# Patient Record
Sex: MALE | Race: WHITE | HISPANIC OR LATINO | Employment: FULL TIME | URBAN - METROPOLITAN AREA
[De-identification: names, ages, dates, MRNs, and addresses within clinical notes are randomized per-mention and may not be internally consistent; named-entity substitution may affect disease eponyms.]

---

## 2018-10-18 ENCOUNTER — HOSPITAL ENCOUNTER (EMERGENCY)
Facility: CLINIC | Age: 43
Discharge: HOME | End: 2018-10-18
Attending: EMERGENCY MEDICINE | Admitting: EMERGENCY MEDICINE

## 2018-10-18 VITALS
OXYGEN SATURATION: 97 % | HEART RATE: 68 BPM | TEMPERATURE: 96.7 F | DIASTOLIC BLOOD PRESSURE: 78 MMHG | WEIGHT: 214.2 LBS | SYSTOLIC BLOOD PRESSURE: 117 MMHG | RESPIRATION RATE: 16 BRPM

## 2018-10-18 DIAGNOSIS — M54.50 ACUTE BILATERAL LOW BACK PAIN WITHOUT SCIATICA: ICD-10-CM

## 2018-10-18 PROCEDURE — 99284 EMERGENCY DEPT VISIT MOD MDM: CPT | Mod: Z6 | Performed by: EMERGENCY MEDICINE

## 2018-10-18 PROCEDURE — 99282 EMERGENCY DEPT VISIT SF MDM: CPT

## 2018-10-18 RX ORDER — IBUPROFEN 600 MG/1
600 TABLET, FILM COATED ORAL EVERY 8 HOURS PRN
Qty: 20 TABLET | Refills: 0 | Status: SHIPPED | OUTPATIENT
Start: 2018-10-18 | End: 2022-01-05 | Stop reason: ALTCHOICE

## 2018-10-18 RX ORDER — CYCLOBENZAPRINE HCL 10 MG
10 TABLET ORAL 3 TIMES DAILY PRN
Qty: 20 TABLET | Refills: 0 | Status: SHIPPED | OUTPATIENT
Start: 2018-10-18 | End: 2018-10-24

## 2018-10-18 ASSESSMENT — ENCOUNTER SYMPTOMS
ROS GI COMMENTS: NEGATIVE FOR LOSS OF BOWEL CONTROL
NUMBNESS: 0
FEVER: 0
BACK PAIN: 1
MYALGIAS: 1
CONSTITUTIONAL NEGATIVE: 1
ABDOMINAL PAIN: 0
GASTROINTESTINAL NEGATIVE: 1
WEAKNESS: 0
CHILLS: 0
JOINT SWELLING: 0
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
## 2018-10-18 NOTE — ED AVS SNAPSHOT
[facility], [place], Emergency Department    [address]    [facility] [address]    Phone:  [phone]    Fax:  [phone]                                       Javier Vizcaino   MRN: [id number]    Department:  [facility], Emergency Department   Date of Visit:  10/18/2018           After Visit Summary Signature Page     I have received my discharge instructions, and my questions have been answered. I have discussed any challenges I see with this plan with the nurse or doctor.    ..........................................................................................................................................  Patient/Patient Representative Signature      ..........................................................................................................................................  Patient Representative Print Name and Relationship to Patient    ..................................................               ................................................  Date                                   Time    ..........................................................................................................................................  Reviewed by Signature/Title    ...................................................              ..............................................  Date                                               Time          22EPIC Rev 08/18

## 2018-10-18 NOTE — ED AVS SNAPSHOT
[facility], Emergency Department    [address]    [place] [address]    Phone:  [phone]    Fax:  [phone]                                       Javier Vizcaino   MRN: [id number]    Department:  [facility], Emergency Department   Date of Visit:  10/18/2018           Patient Information     Date Of Birth          1975        Your diagnoses for this visit were:     Acute bilateral low back pain without sciatica        You were seen by Aryan Robbins MD.        Discharge Instructions       Use medications as directed  Follow-up with a primary care provider to see how you are doing  If you develop pain that goes down to your foot for 2 weeks or more you should have an MRI of your lumbar spine  Please make an appointment to follow up with [facility] (phone: [phone])    24 Hour Appointment Hotline       To make an appointment at any [facility], call [phone] ([phone]). If you don't have a family doctor or clinic, we will help you find one. [place] clinics are conveniently located to serve the needs of you and your family.             Review of your medicines      START taking        Dose / Directions Last dose taken    cyclobenzaprine 10 MG tablet   Commonly known as:  FLEXERIL   Dose:  10 mg   Quantity:  20 tablet        Take 1 tablet (10 mg) by mouth 3 times daily as needed for muscle spasms   Refills:  0          CONTINUE these medicines which may have CHANGED, or have new prescriptions. If we are uncertain of the size of tablets/capsules you have at home, strength may be listed as something that might have changed.        Dose / Directions Last dose taken    * IBUPROFEN PO   Dose:  800 mg   What changed:  Another medication with the same name was added. Make sure you understand how and when to take each.        Take 800 mg by mouth every 6 hours as needed for moderate pain   Refills:  0        * ibuprofen 600 MG tablet   Commonly  known as:  ADVIL/MOTRIN   Dose:  600 mg   What changed:  You were already taking a medication with the same name, and this prescription was added. Make sure you understand how and when to take each.   Quantity:  20 tablet        Take 1 tablet (600 mg) by mouth every 8 hours as needed for moderate pain   Refills:  0        * Notice:  This list has 2 medication(s) that are the same as other medications prescribed for you. Read the directions carefully, and ask your doctor or other care provider to review them with you.            Prescriptions were sent or printed at these locations (2 Prescriptions)                   Other Prescriptions                Printed at Department/Unit printer (2 of 2)         cyclobenzaprine (FLEXERIL) 10 MG tablet               ibuprofen (ADVIL/MOTRIN) 600 MG tablet                Orders Needing Specimen Collection     None      Pending Results     No orders found from 10/16/2018 to 10/19/2018.            Pending Culture Results     No orders found from 10/16/2018 to 10/19/2018.            Pending Results Instructions     If you had any lab results that were not finalized at the time of your Discharge, you can call the ED Lab Result RN at [phone]. You will be contacted by this team for any positive Lab results or changes in treatment. The nurses are available 7 days a week from 10A to 6:30P.  You can leave a message 24 hours per day and they will return your call.        Thank you for choosing [facility]       Thank you for choosing [facility] for your care. Our goal is always to provide you with excellent care. Hearing back from our patients is one way we can continue to improve our services. Please take a few minutes to complete the written survey that you may receive in the mail after you visit with us. Thank you!        [employer] Information     [employer] lets you send messages to your doctor, view your test results, renew your prescriptions, schedule appointments and more. To sign up, go  "to [email or web address] . Click on \"Log in\" on the left side of the screen, which will take you to the Welcome page. Then click on \"Sign up Now\" on the right side of the page.     You will be asked to enter the access code listed below, as well as some personal information. Please follow the directions to create your username and password.     Your access code is: [email or web address]  Expires: 2019 10:36 AM     Your access code will  in 90 days. If you need help or a new code, please call your [facility] clinic or [phone].        Care EveryWhere ID     This is your Care EveryWhere ID. This could be used by other organizations to access your [facility] medical records  [id number]        Equal Access to Services     LUCILLE BAÑUELOS : Doris Freeman, elizabeth rasmussen, gladis aquino, jerri martines. So [facility] [phone].    ATENCIÓN: Si habla español, tiene a saavedra disposición servicios gratuitos de asistencia lingüística. Llestrada al [phone].    We comply with applicable federal civil rights laws and [place] laws. We do not discriminate on the basis of race, color, national origin, age, disability, sex, sexual orientation, or gender identity.            After Visit Summary       This is your record. Keep this with you and show to your community pharmacist(s) and doctor(s) at your next visit.                  "

## 2018-10-18 NOTE — LETTER
October 18, 2018      To Whom It May Concern:      Javier Vizcaino was seen in our Emergency Department today, 10/18/18.    He may return to work/school when improved.    Sincerely,        Aryan Robbins MD

## 2018-10-18 NOTE — DISCHARGE INSTRUCTIONS
Use medications as directed  Follow-up with a primary care provider to see how you are doing  If you develop pain that goes down to your foot for 2 weeks or more you should have an MRI of your lumbar spine  Please make an appointment to follow up with [facility] (phone: [phone])

## 2019-09-07 ENCOUNTER — HOSPITAL ENCOUNTER (EMERGENCY)
Facility: CLINIC | Age: 44
Discharge: HOME | End: 2019-09-08
Attending: EMERGENCY MEDICINE | Admitting: EMERGENCY MEDICINE

## 2019-09-07 DIAGNOSIS — K42.9 PERIUMBILICAL HERNIA: ICD-10-CM

## 2019-09-07 LAB
BASOPHILS # BLD AUTO: 0 10E9/L (ref 0–0.2)
BASOPHILS NFR BLD AUTO: 0.3 %
DIFFERENTIAL METHOD BLD: ABNORMAL
EOSINOPHIL # BLD AUTO: 0.2 10E9/L (ref 0–0.7)
EOSINOPHIL NFR BLD AUTO: 3.4 %
ERYTHROCYTE [DISTWIDTH] IN BLOOD BY AUTOMATED COUNT: 12.7 % (ref 10–15)
HCT VFR BLD AUTO: 39.8 % (ref 40–53)
HGB BLD-MCNC: 13.4 G/DL (ref 13.3–17.7)
IMM GRANULOCYTES # BLD: 0 10E9/L (ref 0–0.4)
IMM GRANULOCYTES NFR BLD: 0.1 %
LYMPHOCYTES # BLD AUTO: 2.3 10E9/L (ref 0.8–5.3)
LYMPHOCYTES NFR BLD AUTO: 32.1 %
MCH RBC QN AUTO: 29.6 PG (ref 26.5–33)
MCHC RBC AUTO-ENTMCNC: 33.7 G/DL (ref 31.5–36.5)
MCV RBC AUTO: 88 FL (ref 78–100)
MONOCYTES # BLD AUTO: 0.4 10E9/L (ref 0–1.3)
MONOCYTES NFR BLD AUTO: 5.9 %
NEUTROPHILS # BLD AUTO: 4.2 10E9/L (ref 1.6–8.3)
NEUTROPHILS NFR BLD AUTO: 58.2 %
NRBC # BLD AUTO: 0 10*3/UL
NRBC BLD AUTO-RTO: 0 /100
PLATELET # BLD AUTO: 275 10E9/L (ref 150–450)
RBC # BLD AUTO: 4.52 10E12/L (ref 4.4–5.9)
WBC # BLD AUTO: 7.2 10E9/L (ref 4–11)

## 2019-09-07 PROCEDURE — 99284 EMERGENCY DEPT VISIT MOD MDM: CPT | Mod: Z6 | Performed by: EMERGENCY MEDICINE

## 2019-09-07 PROCEDURE — 85025 COMPLETE CBC W/AUTO DIFF WBC: CPT | Performed by: EMERGENCY MEDICINE

## 2019-09-07 PROCEDURE — 80048 BASIC METABOLIC PNL TOTAL CA: CPT | Performed by: EMERGENCY MEDICINE

## 2019-09-07 PROCEDURE — 99285 EMERGENCY DEPT VISIT HI MDM: CPT | Mod: 25 | Performed by: EMERGENCY MEDICINE

## 2019-09-07 RX ORDER — IBUPROFEN 200 MG
400 TABLET ORAL EVERY 4 HOURS PRN
COMMUNITY
End: 2022-01-05 | Stop reason: ALTCHOICE

## 2019-09-07 ASSESSMENT — ENCOUNTER SYMPTOMS
DIARRHEA: 0
APPETITE CHANGE: 0
CONSTIPATION: 0
ABDOMINAL PAIN: 1

## 2019-09-07 ASSESSMENT — MIFFLIN-ST. JEOR: SCORE: 1921.38

## 2019-09-07 NOTE — ED AVS SNAPSHOT
[facility], [place], Emergency Department  [address]  [facility] [address]  Phone:  [phone]  Fax:  [phone]                                    Javier Vizcaino   MRN: [id number]    Department:  [facility], Emergency Department   Date of Visit:  9/7/2019           After Visit Summary Signature Page    I have received my discharge instructions, and my questions have been answered. I have discussed any challenges I see with this plan with the nurse or doctor.    ..........................................................................................................................................  Patient/Patient Representative Signature      ..........................................................................................................................................  Patient Representative Print Name and Relationship to Patient    ..................................................               ................................................  Date                                   Time    ..........................................................................................................................................  Reviewed by Signature/Title    ...................................................              ..............................................  Date                                               Time          22EPIC Rev 08/18

## 2019-09-08 ENCOUNTER — APPOINTMENT (OUTPATIENT)
Dept: CT IMAGING | Facility: CLINIC | Age: 44
End: 2019-09-08
Attending: EMERGENCY MEDICINE

## 2019-09-08 VITALS
HEIGHT: 72 IN | DIASTOLIC BLOOD PRESSURE: 67 MMHG | BODY MASS INDEX: 29.66 KG/M2 | SYSTOLIC BLOOD PRESSURE: 108 MMHG | HEART RATE: 65 BPM | TEMPERATURE: 97.7 F | RESPIRATION RATE: 16 BRPM | WEIGHT: 219 LBS | OXYGEN SATURATION: 98 %

## 2019-09-08 LAB
ANION GAP SERPL CALCULATED.3IONS-SCNC: 8 MMOL/L (ref 3–14)
BUN SERPL-MCNC: 28 MG/DL (ref 7–30)
CALCIUM SERPL-MCNC: 8.1 MG/DL (ref 8.5–10.1)
CHLORIDE SERPL-SCNC: 104 MMOL/L (ref 94–109)
CO2 SERPL-SCNC: 28 MMOL/L (ref 20–32)
CREAT SERPL-MCNC: 1.17 MG/DL (ref 0.66–1.25)
GFR SERPL CREATININE-BSD FRML MDRD: 75 ML/MIN/{1.73_M2}
GLUCOSE SERPL-MCNC: 108 MG/DL (ref 70–99)
POTASSIUM SERPL-SCNC: 3.7 MMOL/L (ref 3.4–5.3)
SODIUM SERPL-SCNC: 140 MMOL/L (ref 133–144)

## 2019-09-08 PROCEDURE — 74177 CT ABD & PELVIS W/CONTRAST: CPT

## 2019-09-08 PROCEDURE — 25000128 H RX IP 250 OP 636: Performed by: EMERGENCY MEDICINE

## 2019-09-08 RX ORDER — IOPAMIDOL 755 MG/ML
100 INJECTION, SOLUTION INTRAVASCULAR ONCE
Status: COMPLETED | OUTPATIENT
Start: 2019-09-08 | End: 2019-09-08

## 2019-09-08 RX ADMIN — IOPAMIDOL 100 ML: 755 INJECTION, SOLUTION INTRAVENOUS at 00:45

## 2019-09-08 NOTE — ED PROVIDER NOTES
History     Chief Complaint   Patient presents with     Abdominal Pain     intermittent pain since Monday and the lump in his navel is getting bigger; currently the lump/protrusion is the size of a quarter to half dollar; tender to touch ; no hx of hernia     HPI  Javier Vizcaino is a 44 year old male who presents for evaluation of abdominal pain. The patient states he's had a lump near his belly button for the past year. He reports the lump has increased in size this past week and has had intermittent pain around it as well. Today, his pain became constant. He denies change in appetite or stool. Of note, the patient does have a 3 year old daughter and will often lift her.    History reviewed. No pertinent past medical history.    Past Surgical History:   Procedure Laterality Date     ABDOMEN SURGERY      appendectomy     CARDIAC SURGERY         History reviewed. No pertinent family history.    Social History     Tobacco Use     Smoking status: Never Smoker     Smokeless tobacco: Never Used   Substance Use Topics     Alcohol use: No       No current facility-administered medications for this encounter.      Current Outpatient Medications   Medication     ibuprofen (ADVIL/MOTRIN) 200 MG tablet     ibuprofen (ADVIL/MOTRIN) 600 MG tablet     IBUPROFEN PO      No Known Allergies    I have reviewed the Medications, Allergies, Past Medical and Surgical History, and Social History in the Epic system.    Review of Systems   Constitutional: Negative for appetite change.   Gastrointestinal: Positive for abdominal pain. Negative for constipation and diarrhea.       Physical Exam   BP: 125/73  Pulse: 75  Temp: 97.7  F (36.5  C)  Resp: 16  Height: 182.9 cm (6')  Weight: 99.3 kg (219 lb)  SpO2: 95 %      Physical Exam   Constitutional: He is oriented to person, place, and time. He appears well-developed and well-nourished.   HENT:   Head: Atraumatic.   Eyes: EOM are normal.   Cardiovascular: Normal rate, regular rhythm and  normal heart sounds.   Pulmonary/Chest: Effort normal and breath sounds normal.   Abdominal: Soft. Bowel sounds are normal. A hernia is present. Hernia confirmed positive in the ventral area (with tenderness over the hernia.  ).   Neurological: He is alert and oriented to person, place, and time.   Skin: Skin is dry.   Nursing note and vitals reviewed.      ED Course        Procedures       10:57 PM  The patient was seen and examined by Dr. Mccoy in Room 19.        Labs Ordered and Resulted from Time of ED Arrival Up to the Time of Departure from the ED   CBC WITH PLATELETS DIFFERENTIAL - Abnormal; Notable for the following components:       Result Value    Hematocrit 39.8 (*)     All other components within normal limits   BASIC METABOLIC PANEL - Abnormal; Notable for the following components:    Glucose 108 (*)     Calcium 8.1 (*)     All other components within normal limits   MAY SALINE LOCK IV   FREE WATER     Results for orders placed or performed during the hospital encounter of 09/07/19   CT Abdomen Pelvis w Contrast    Narrative    CT ABDOMEN AND PELVIS WITH CONTRAST   9/8/2019 12:48 AM     HISTORY: Complicated hernia.    COMPARISON: None.    TECHNIQUE: Following the uneventful administration of 100 mL  Isovue-370 intravenous contrast, helical sections were acquired from  the top of the diaphragm through the pubic symphysis. Coronal  reconstructions were generated. Radiation dose for this scan was  reduced using automated exposure control, adjustment of the mA and/or  kV according to the patient's size, or iterative reconstruction  technique.    FINDINGS:     Abdomen: The liver, spleen, pancreas, adrenal glands and kidneys are  unremarkable. 1.7 cm intermediate attenuation structure in the  gallbladder, likely a gallstone. No enlarged lymph nodes or free fluid  in the upper abdomen.    Scan through the lower chest is significant for a few linear opacities  in bilateral lung bases that likely represent  scarring and/or  atelectasis.    Pelvis: The small and large bowel are normal in caliber. The appendix  is not visualized. No bowel wall thickening, pneumatosis or free  intraperitoneal gas. No enlarged lymph nodes or free fluid in the  pelvis. Small paraumbilical hernia containing fat. Slight haziness is  present within the fat within the hernia (series 2, image 62).      Impression    IMPRESSION:   1. Small paraumbilical hernia containing fat. There is slight haziness  within the fat within the hernia that could relate to inflammation.  2. No other cause of acute pain identified in the abdomen or pelvis.  3. Probable cholelithiasis.    CURLY CORREIA MD   CBC with platelets differential   Result Value Ref Range    WBC 7.2 4.0 - 11.0 10e9/L    RBC Count 4.52 4.4 - 5.9 10e12/L    Hemoglobin 13.4 13.3 - 17.7 g/dL    Hematocrit 39.8 (L) 40.0 - 53.0 %    MCV 88 78 - 100 fl    MCH 29.6 26.5 - 33.0 pg    MCHC 33.7 31.5 - 36.5 g/dL    RDW 12.7 10.0 - 15.0 %    Platelet Count 275 150 - 450 10e9/L    Diff Method Automated Method     % Neutrophils 58.2 %    % Lymphocytes 32.1 %    % Monocytes 5.9 %    % Eosinophils 3.4 %    % Basophils 0.3 %    % Immature Granulocytes 0.1 %    Nucleated RBCs 0 0 /100    Absolute Neutrophil 4.2 1.6 - 8.3 10e9/L    Absolute Lymphocytes 2.3 0.8 - 5.3 10e9/L    Absolute Monocytes 0.4 0.0 - 1.3 10e9/L    Absolute Eosinophils 0.2 0.0 - 0.7 10e9/L    Absolute Basophils 0.0 0.0 - 0.2 10e9/L    Abs Immature Granulocytes 0.0 0 - 0.4 10e9/L    Absolute Nucleated RBC 0.0    Basic metabolic panel   Result Value Ref Range    Sodium 140 133 - 144 mmol/L    Potassium 3.7 3.4 - 5.3 mmol/L    Chloride 104 94 - 109 mmol/L    Carbon Dioxide 28 20 - 32 mmol/L    Anion Gap 8 3 - 14 mmol/L    Glucose 108 (H) 70 - 99 mg/dL    Urea Nitrogen 28 7 - 30 mg/dL    Creatinine 1.17 0.66 - 1.25 mg/dL    GFR Estimate 75 >60 mL/min/[1.73_m2]    GFR Estimate If Black 87 >60 mL/min/[1.73_m2]    Calcium 8.1 (L) 8.5 - 10.1 mg/dL             Assessments & Plan (with Medical Decision Making)   The patient presents to the ED with pain and swelling around the umbilical region.  He has a small umbilical hernia with tenderness.  Initially I am unable to reduce it.  His abdomen is soft/benign.  Labs were ordered and reviewed.  WBC is normal.  Abd/pelvic CT was done and shows a fat containing periumbilical hernia.  I again attempted to reduce the hernia and was eventually able to reduce it.  We discussed CT results.  He was discharge with surgery f/u, avoiding straining and lifting greater than 10 lbs. He has a 3 yo daughter at home.      I have reviewed the nursing notes.    I have reviewed the findings, diagnosis, plan and need for follow up with the patient.    New Prescriptions    No medications on file       Final diagnoses:   Periumbilical hernia   I, Justen Hammond, am serving as a trained medical scribe to document services personally performed by Aleja Mccoy MD, based on the provider's statements to me.   IAleja MD, was physically present and have reviewed and verified the accuracy of this note documented by Justen Hammond.    9/7/2019   [facility], [place], EMERGENCY DEPARTMENT     Aleja Mccoy MD  09/08/19 0145

## 2019-09-08 NOTE — DISCHARGE INSTRUCTIONS
Please make an appointment to follow up with Surgery (General) (phone: [phone]) as soon as possible to discuss hernia repair.    Return if worsening abdominal pain, vomiting.     Avoid straining.  Do not lift more than 10 lbs until you see the surgeon.  Keep your stool soft.  You can use benefiber to keep your stool soft (found at local drug store/[employer])

## 2019-09-11 ENCOUNTER — OFFICE VISIT (OUTPATIENT)
Dept: SURGERY | Facility: CLINIC | Age: 44
End: 2019-09-11

## 2019-09-11 VITALS
OXYGEN SATURATION: 97 % | HEART RATE: 71 BPM | SYSTOLIC BLOOD PRESSURE: 118 MMHG | TEMPERATURE: 98.3 F | WEIGHT: 215.8 LBS | HEIGHT: 72 IN | BODY MASS INDEX: 29.23 KG/M2 | DIASTOLIC BLOOD PRESSURE: 74 MMHG

## 2019-09-11 DIAGNOSIS — K42.0 UMBILICAL HERNIA WITH OBSTRUCTION: Primary | ICD-10-CM

## 2019-09-11 ASSESSMENT — PAIN SCALES - GENERAL: PAINLEVEL: MODERATE PAIN (5)

## 2019-09-11 ASSESSMENT — MIFFLIN-ST. JEOR: SCORE: 1906.86

## 2019-09-11 NOTE — LETTER
Date:September 12, 2019      Patient was self referred, no letter generated. Do not send.        [facility] Information

## 2019-09-11 NOTE — NURSING NOTE
Chief Complaint   Patient presents with     Consult     New hernia consult.       Vitals:    09/11/19 0912   BP: 118/74   BP Location: Left arm   Patient Position: Sitting   Cuff Size: Adult Large   Pulse: 71   Temp: 98.3  F (36.8  C)   TempSrc: Oral   SpO2: 97%   Weight: 97.9 kg (215 lb 12.8 oz)   Height: 1.829 m (6')       Body mass index is 29.27 kg/m .                 Carole Handley CMA

## 2019-09-11 NOTE — PROGRESS NOTES
This is a very pleasant 44-year-old gentleman who presents with symptomatic umbilical hernia which was evaluated in the emergency department and easily reduced.  The patient actually had a weight of almost 300 pounds he is down to 215 pounds today.  He had lost on his own intentionally the weight and continues to start to lose weight.  On examination he is a very small umbilical hernia approximately 1 cm in size.  There is no evidence of erythema and of the hernia is completely reduced.  His past medical history and surgical history is significant for cardiac surgery as a child.  Our plan is to ask him to lose an additional 15 pounds if he has no more symptoms then he will not  need any additional follow-up for this.  If he still is experiencing discomfort we will plan for an open umbilical hernia repair.

## 2019-09-11 NOTE — LETTER
9/11/2019       RE: Javier Vizcaino  [address]  [facility] [address]     Dear Colleague,    Thank you for referring your patient, Javier Vizcaino, to the [facility] GENERAL SURGERY at [facility]. Please see a copy of my visit note below.    This is a very pleasant 44-year-old gentleman who presents with symptomatic umbilical hernia which was evaluated in the emergency department and easily reduced.  The patient actually had a weight of almost 300 pounds he is down to 215 pounds today.  He had lost on his own intentionally the weight and continues to start to lose weight.  On examination he is a very small umbilical hernia approximately 1 cm in size.  There is no evidence of erythema and of the hernia is completely reduced.  His past medical history and surgical history is significant for cardiac surgery as a child.  Our plan is to ask him to lose an additional 15 pounds if he has no more symptoms then he will not  need any additional follow-up for this.  If he still is experiencing discomfort we will plan for an open umbilical hernia repair.    Again, thank you for allowing me to participate in the care of your patient.      Sincerely,    Reynold Correia MD

## 2021-06-05 ENCOUNTER — HEALTH MAINTENANCE LETTER (OUTPATIENT)
Age: 46
End: 2021-06-05

## 2021-09-25 ENCOUNTER — HEALTH MAINTENANCE LETTER (OUTPATIENT)
Age: 46
End: 2021-09-25

## 2022-01-05 ENCOUNTER — HOSPITAL ENCOUNTER (EMERGENCY)
Facility: CLINIC | Age: 47
Discharge: HOME | End: 2022-01-05
Attending: EMERGENCY MEDICINE | Admitting: EMERGENCY MEDICINE
Payer: COMMERCIAL

## 2022-01-05 VITALS
DIASTOLIC BLOOD PRESSURE: 81 MMHG | RESPIRATION RATE: 16 BRPM | OXYGEN SATURATION: 97 % | TEMPERATURE: 98.5 F | SYSTOLIC BLOOD PRESSURE: 131 MMHG | HEART RATE: 78 BPM

## 2022-01-05 DIAGNOSIS — M54.41 ACUTE RIGHT-SIDED LOW BACK PAIN WITH RIGHT-SIDED SCIATICA: ICD-10-CM

## 2022-01-05 DIAGNOSIS — Z20.822 SUSPECTED 2019 NOVEL CORONAVIRUS INFECTION: ICD-10-CM

## 2022-01-05 LAB
FLUAV RNA SPEC QL NAA+PROBE: NEGATIVE
FLUBV RNA RESP QL NAA+PROBE: NEGATIVE
SARS-COV-2 RNA RESP QL NAA+PROBE: NEGATIVE

## 2022-01-05 PROCEDURE — 87636 SARSCOV2 & INF A&B AMP PRB: CPT | Performed by: EMERGENCY MEDICINE

## 2022-01-05 PROCEDURE — 99284 EMERGENCY DEPT VISIT MOD MDM: CPT | Performed by: EMERGENCY MEDICINE

## 2022-01-05 PROCEDURE — 250N000013 HC RX MED GY IP 250 OP 250 PS 637: Performed by: EMERGENCY MEDICINE

## 2022-01-05 PROCEDURE — 99283 EMERGENCY DEPT VISIT LOW MDM: CPT

## 2022-01-05 PROCEDURE — C9803 HOPD COVID-19 SPEC COLLECT: HCPCS

## 2022-01-05 RX ORDER — IBUPROFEN 600 MG/1
600 TABLET, FILM COATED ORAL ONCE
Status: COMPLETED | OUTPATIENT
Start: 2022-01-05 | End: 2022-01-05

## 2022-01-05 RX ORDER — IBUPROFEN 200 MG
400 TABLET ORAL EVERY 6 HOURS PRN
Qty: 60 TABLET | Refills: 0 | Status: SHIPPED | OUTPATIENT
Start: 2022-01-05

## 2022-01-05 RX ORDER — OXYCODONE AND ACETAMINOPHEN 5; 325 MG/1; MG/1
1 TABLET ORAL ONCE
Status: COMPLETED | OUTPATIENT
Start: 2022-01-05 | End: 2022-01-05

## 2022-01-05 RX ORDER — ACETAMINOPHEN 500 MG
1000 TABLET ORAL EVERY 6 HOURS PRN
Qty: 30 TABLET | Refills: 0 | Status: SHIPPED | OUTPATIENT
Start: 2022-01-05

## 2022-01-05 RX ORDER — CYCLOBENZAPRINE HCL 10 MG
10 TABLET ORAL 3 TIMES DAILY PRN
Qty: 20 TABLET | Refills: 0 | Status: SHIPPED | OUTPATIENT
Start: 2022-01-05 | End: 2022-01-11

## 2022-01-05 RX ADMIN — OXYCODONE HYDROCHLORIDE AND ACETAMINOPHEN 1 TABLET: 5; 325 TABLET ORAL at 16:15

## 2022-01-05 RX ADMIN — IBUPROFEN 600 MG: 600 TABLET ORAL at 16:15

## 2022-01-05 NOTE — DISCHARGE INSTRUCTIONS
"Please make an appointment to follow up with Your Primary Care Provider in 5-10 days.  You should also keep your appointment with physical therapy.    Please return the emergency department if you have worsening pain, numbness or weakness in your legs or feet, loss of bowel or bladder control, numbness in the groin area, difficulty walking, fever, any other concerns.    Discharge Instructions for COVID-19 Patients  You have--or may have--COVID-19. Please follow the instructions listed below.   If you have a weakened immune system, discuss with your doctor any other actions you need to take.  How can I protect others?  If you have symptoms (fever, cough, body aches or trouble breathing):  Stay home and away from others (self-isolate) until:  Your other symptoms have resolved (gotten better). And   You've had no fever--and no medicine that reduces fever--for 1 full day (24 hours). And   At least 10 days have passed since your symptoms started. (You may need to wait 20 days. Follow the advice of your care team.)  If you don't show symptoms, but testing showed that you have COVID-19:  Stay home and away from others (self-isolate) until at least 10 days have passed since the date of your first positive COVID-19 test.  During this time  Stay in your own room, even for meals. Use your own bathroom if you can.  Stay away from others in your home. No hugging, kissing or shaking hands. No visitors.  Don't go to work, school or anywhere else.  Clean \"high touch\" surfaces often (doorknobs, counters, handles). Use household cleaning spray or wipes.  You'll find a full list of  on the EPA website: www.epa.gov/pesticide-registration/list-n-disinfectants-use-against-sars-cov-2.  Cover your mouth and nose with a mask or other face covering to avoid spreading germs.  Wash your hands and face often. Use soap and water.  Caregivers in these groups are at risk for severe illness due to COVID-19:  People 65 years and " older  People who live in a nursing home or long-term care facility  People with chronic disease (lung, heart, cancer, diabetes, kidney, liver, immunologic)  People who have a weakened immune system, including those who:  Are in cancer treatment  Take medicine that weakens the immune system, such as corticosteroids  Had a bone marrow or organ transplant  Have an immune deficiency  Have poorly controlled HIV or AIDS  Are obese (body mass index of 40 or higher)  Smoke regularly  Caregivers should wear gloves while washing dishes, handling laundry and cleaning bedrooms and bathrooms.  Use caution when washing and drying laundry: Don't shake dirty laundry and use the warmest water setting that you can.  For more tips on managing your health at home, go to www.cdc.gov/coronavirus/2019-ncov/downloads/10Things.pdf.  How can I take care of myself at home?  Get lots of rest. Drink extra fluids (unless a doctor has told you not to).  Take Tylenol (acetaminophen) for fever or pain. If you have liver or kidney problems, ask your family doctor if it's okay to take Tylenol.   Adults can take either:   650 mg (two 325 mg pills) every 4 to 6 hours, or   1,000 mg (two 500 mg pills) every 8 hours as needed.  Note: Don't take more than 3,000 mg in one day. Acetaminophen is found in many medicines (both prescribed and over-the-counter medicines). Read all labels to be sure you don't take too much.   For children, check the Tylenol bottle for the right dose. The dose is based on the child's age or weight.  If you have other health problems (like cancer, heart failure, an organ transplant or severe kidney disease): Call your specialty clinic if you don't feel better in the next 2 days.  Know when to call 911. Emergency warning signs include:  Trouble breathing or shortness of breath  Pain or pressure in the chest that doesn't go away  Feeling confused like you haven't felt before, or not being able to wake up  Bluish-colored lips or  face  Your doctor may have prescribed a blood thinner medicine. Follow their instructions.  Where can I get more information?  [facility] - About COVID-19:   [email or web address]  CDC - What to Do If You're Sick: www.cdc.gov/coronavirus/2019-ncov/about/steps-when-sick.html  CDC - Ending Home Isolation: www.cdc.gov/coronavirus/2019-ncov/hcp/disposition-in-home-patients.html  CDC - Caring for Someone: www.cdc.gov/coronavirus/2019-ncov/if-you-are-sick/care-for-someone.html  [facility] - Interim Guidance for Hospital Discharge to Home: www.health.[facility].[place]./diseases/coronavirus/hcp/hospdischarge.pdf  Below are the COVID-19 hotlines at the [place] Department of Health ([facility]). Interpreters are available.  For health questions: Call 953-224-1870 or 1-883.896.9366 (7 a.m. to 7 p.m.)  For questions about schools and childcare: Call 157-093-9576 or 1-826.269.4770 (7 a.m. to 7 p.m.)    For informational purposes only. Not to replace the advice of your health care provider. Clinically reviewed by Dr. Zohaib Gutierres.   Copyright   2020 [employer]. All rights reserved. "[employer] [id number] - REV 01/05/21.        Please check [id number] for the results of your Covid test and influenza test.  If your test is positive, you will need to quarantine for 5 to 10 days, only 5 days if you have a negative test on day 5.

## 2022-01-05 NOTE — ED PROVIDER NOTES
[facility] EMERGENCY DEPARTMENT ([facility])    1/05/22        History     Chief Complaint   Patient presents with     Back Pain     HPI  Javier Vizcaino is a 46 year old male who presents to the Emergency Department with back pain.  Patient states that he has back pain since a work-related lifting injury on December 12.  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  Patient was seen at [facility] at that time and x-rays of the lumbar spine were negative.  He was prescribed Robaxin, ibuprofen and Tylenol.  Patient states he has run out of these medications and his pain is returned.  He was referred for some physical therapy, however, his first appointment was canceled and so he has not been to physical therapy yet.  His next appointment is scheduled for next week.  He denies fever, previous back surgery, loss of bowel or bladder control, saddle anesthesia.  Patient reports the pain is primarily in the right lower side of the back and is radiating into the right leg.  It does not radiate past the distal femur.  Patient also reports a cough that he has had for the last couple of days.  His wife has had a fever and cough as well.  He has been immunized against COVID-19, but did not get a flu shot this year.    Past Medical History  History reviewed. No pertinent past medical history.  Past Surgical History:   Procedure Laterality Date     ABDOMEN SURGERY      appendectomy     CARDIAC SURGERY       acetaminophen (TYLENOL) 500 MG tablet  cyclobenzaprine (FLEXERIL) 10 MG tablet  ibuprofen (ADVIL/MOTRIN) 200 MG tablet      No Known Allergies  Family History  No family history on file.  Social History   Social History     Tobacco Use     Smoking status: Never Smoker     Smokeless tobacco: Never Used   Substance Use Topics     Alcohol use: No     Drug use: No      Past medical history, past surgical history, medications, allergies,  family history, and social history were reviewed with the patient. No additional pertinent items.       Review of Systems  A complete review of systems was performed with pertinent positives and negatives noted in the HPI, and all other systems negative.    Physical Exam   BP: 131/81  Pulse: 78  Temp: 98.5  F (36.9  C)  Resp: 16  SpO2: 97 %  Physical Exam    GEN:  Alert, well developed, no acute distress  HEENT:  PERRL, EOMI, Mucous membranes are moist.   Cardio:  RRR, no murmur, radial pulses equal bilaterally  PULM:  Lungs clear, good air movement, no wheezes, rales   Abd:  Soft, normal bowel sounds, no focal tenderness  Back exam:  No CVA tenderness, no T-spine tenderness, L-spine tenderness.  Patient has some mild tenderness along the right paraspinal area at the lumbar level.  There is no rash on the back, no ecchymosis.  Musculoskeletal:  normal range of motion of the extremities, no lower extremity swelling or calf tenderness  Neuro:  Alert and oriented X3, Follows commands, moving all extremities spontaneously.  Normal strength and sensation throughout the lower extremities bilaterally, normal patellar reflexes bilaterally.  Skin:  Warm, dry   ED Course      Procedures         Patient was given Percocet and ibuprofen for pain in the ED.  Care everywhere records were reviewed.  Lumbar spine x-ray done at [facility] on December 12 results were reviewed and are shown below.    FINDINGS: 3 upright views of the lumbar spine demonstrate mild rightward curvature, likely positional. There is no fracture. There is no subluxation. Mild degenerative disc and facet changes between L4 and S1. Nonobstructive bowel gas pattern.     IMPRESSION   IMPRESSION: Mild degenerative changes in the lower lumbar spine. No acute fracture or subluxation.     Reading Radiologist: Eddie Fraire   Specimen Collected: 12/12/21      The x-ray results were discussed with the patient.  We discussed indications for MRI and I  reassured the patient that he does not have any acute neurologic deficits or need for emergent MRI today.     No results found for any visits on 01/05/22.  Medications   oxyCODONE-acetaminophen (PERCOCET) 5-325 MG per tablet 1 tablet (1 tablet Oral Given 1/5/22 1615)   ibuprofen (ADVIL/MOTRIN) tablet 600 mg (600 mg Oral Given 1/5/22 1615)        Assessments & Plan (with Medical Decision Making)   Patient presents with work-related back pain that occurred December 12.  He continues to have pain and has run out of his prescriptions for acetaminophen, ibuprofen and Robaxin.  Patient symptoms are most suggestive of sciatica with pain radiating down the leg.  Doubt acute pyelonephritis given lack of fever and pain is lower than what would be expected for pyelonephritis.  Given the timing of the pain occurring just after heavy lifting, I also doubt acute ureteral stone.  He plans to keep his appointment for physical therapy starting next week.  There are no signs of cauda equina syndrome or acute cord compression.  No neurologic deficits.  I do not think patient needs emergent MRI.  He was happy to know that the x-ray was negative for fracture or malalignment.  Patient was advised to follow-up as outpatient primary care provider and consider outpatient MRI.  Patient was given prescriptions for acetaminophen, Flexeril and ibuprofen.  Patient was advised to return the emergency department if he develops fever, worsening pain, numbness or weakness in the legs or feet, loss of bowel or bladder control, numbness in the groin area, any other concerns.    I have reviewed the nursing notes. I have reviewed the findings, diagnosis, plan and need for follow up with the patient.    New Prescriptions    ACETAMINOPHEN (TYLENOL) 500 MG TABLET    Take 2 tablets (1,000 mg) by mouth every 6 hours as needed for mild pain    CYCLOBENZAPRINE (FLEXERIL) 10 MG TABLET    Take 1 tablet (10 mg) by mouth 3 times daily as needed for muscle spasms     IBUPROFEN (ADVIL/MOTRIN) 200 MG TABLET    Take 2 tablets (400 mg) by mouth every 6 hours as needed for moderate pain       Final diagnoses:   Acute right-sided low back pain with right-sided sciatica   Suspected 2019 novel coronavirus infection       --  Prisca Truong MD  [facility] EMERGENCY DEPARTMENT  1/5/2022     Prisca Truong MD  01/05/22 [id number]

## 2022-01-05 NOTE — ED TRIAGE NOTES
Patient had a lifting injury at work 2 weeks ago, back has been sore. Was seen, told it was muscles, and given medication. Woke up today with worse pain.

## 2022-07-02 ENCOUNTER — HEALTH MAINTENANCE LETTER (OUTPATIENT)
Age: 47
End: 2022-07-02

## 2022-11-08 ENCOUNTER — HOSPITAL ENCOUNTER (EMERGENCY)
Facility: CLINIC | Age: 47
Discharge: HOME | End: 2022-11-08
Attending: EMERGENCY MEDICINE | Admitting: EMERGENCY MEDICINE
Payer: COMMERCIAL

## 2022-11-08 VITALS
RESPIRATION RATE: 16 BRPM | TEMPERATURE: 98.8 F | DIASTOLIC BLOOD PRESSURE: 78 MMHG | HEART RATE: 78 BPM | SYSTOLIC BLOOD PRESSURE: 120 MMHG | OXYGEN SATURATION: 98 %

## 2022-11-08 DIAGNOSIS — J10.1 INFLUENZA A: ICD-10-CM

## 2022-11-08 LAB
FLUAV RNA SPEC QL NAA+PROBE: POSITIVE
FLUBV RNA RESP QL NAA+PROBE: NEGATIVE
RSV RNA SPEC NAA+PROBE: NEGATIVE
SARS-COV-2 RNA RESP QL NAA+PROBE: NEGATIVE

## 2022-11-08 PROCEDURE — 99283 EMERGENCY DEPT VISIT LOW MDM: CPT | Mod: CS | Performed by: EMERGENCY MEDICINE

## 2022-11-08 PROCEDURE — 99284 EMERGENCY DEPT VISIT MOD MDM: CPT | Mod: CS | Performed by: EMERGENCY MEDICINE

## 2022-11-08 PROCEDURE — C9803 HOPD COVID-19 SPEC COLLECT: HCPCS | Performed by: EMERGENCY MEDICINE

## 2022-11-08 PROCEDURE — 87637 SARSCOV2&INF A&B&RSV AMP PRB: CPT | Performed by: EMERGENCY MEDICINE

## 2022-11-08 RX ORDER — OSELTAMIVIR PHOSPHATE 75 MG/1
75 CAPSULE ORAL 2 TIMES DAILY
Qty: 10 CAPSULE | Refills: 0 | Status: SHIPPED | OUTPATIENT
Start: 2022-11-08 | End: 2022-11-13

## 2022-11-08 RX ORDER — GUAIFENESIN AND DEXTROMETHORPHAN HYDROBROMIDE 600; 30 MG/1; MG/1
1 TABLET, EXTENDED RELEASE ORAL EVERY 12 HOURS
COMMUNITY

## 2022-11-08 ASSESSMENT — ACTIVITIES OF DAILY LIVING (ADL): ADLS_ACUITY_SCORE: 35

## 2022-11-08 NOTE — LETTER
November 8, 2022      To Whom It May Concern:      Javier Vizcaino was seen in our Emergency Department today, 11/08/22.  He has been diagnosed with influenza and needs to stay off work for 72 hours, after which he may return to work if fever free for at least 24 hours.     Sincerely,        Keyur Cobb MD

## 2022-11-08 NOTE — DISCHARGE INSTRUCTIONS
Please make an appointment to follow up with Your Primary Care Provider as needed.    Rest and stay well-hydrated by drinking plenty of fluids.    Tylenol and or ibuprofen for pain and/or fever.    Tamiflu as directed.    Return to the emergency department for any problems.

## 2022-11-08 NOTE — ED TRIAGE NOTES
Pt reports two days ago dry cough started. Yesterday runny nose, severe body aches, headache, fever. Pt taking Mucinex and ibuprofen (last this AM).     Triage Assessment     Row Name 11/08/22 0828       Triage Assessment (Adult)    Airway WDL WDL       Respiratory WDL    Respiratory WDL WDL;cough    Cough Type dry;nonproductive       Skin Circulation/Temperature WDL    Skin Circulation/Temperature WDL WDL       Cardiac WDL    Cardiac WDL WDL       Peripheral/Neurovascular WDL    Peripheral Neurovascular WDL WDL       Cognitive/Neuro/Behavioral WDL    Cognitive/Neuro/Behavioral WDL WDL

## 2022-11-08 NOTE — ED PROVIDER NOTES
ED Provider Note  [facility]      History     Chief Complaint   Patient presents with     Flu Symptoms     HPI  Javier Vizcaino is a 47 year old male who is otherwise healthy.  He presents to the emergency department with 2 days of cough, headache, myalgias and nasal congestion.  He reports that both of his daughters have been diagnosed with influenza.  Patient reports that they had been immunized, but he has not.  He has been having subjective fevers also reports that at times he has felt nauseous and had some abdominal discomfort.  No significant shortness of breath.    Past Medical History  History reviewed. No pertinent past medical history.  Past Surgical History:   Procedure Laterality Date     ABDOMEN SURGERY      appendectomy     CARDIAC SURGERY       dextromethorphan-guaiFENesin (MUCINEX DM)  MG 12 hr tablet  ibuprofen (ADVIL/MOTRIN) 200 MG tablet  oseltamivir (TAMIFLU) 75 MG capsule  acetaminophen (TYLENOL) 500 MG tablet      No Known Allergies  Family History  No family history on file.  Social History   Social History     Tobacco Use     Smoking status: Never     Smokeless tobacco: Never   Substance Use Topics     Alcohol use: No     Drug use: No      Past medical history, past surgical history, medications, allergies, family history, and social history were reviewed with the patient. No additional pertinent items.       Review of Systems  A complete review of systems was performed with pertinent positives and negatives noted in the HPI, and all other systems negative.    Physical Exam   BP: 136/84  Pulse: 85  Temp: 99.1  F (37.3  C)  Resp: 16  SpO2: 98 %  Physical Exam  Vitals and nursing note reviewed.   Constitutional:       General: He is not in acute distress.     Appearance: He is well-developed. He is not ill-appearing, toxic-appearing or diaphoretic.   HENT:      Head: Normocephalic and atraumatic.      Mouth/Throat:      Lips: Pink.      Mouth: Mucous membranes  are moist.      Pharynx: Oropharynx is clear. No oropharyngeal exudate.   Eyes:      General: Lids are normal. No scleral icterus.     Extraocular Movements: Extraocular movements intact.      Right eye: No nystagmus.      Left eye: No nystagmus.      Conjunctiva/sclera: Conjunctivae normal.      Pupils: Pupils are equal, round, and reactive to light.   Neck:      Thyroid: No thyromegaly.      Vascular: No JVD.      Trachea: No tracheal deviation.   Cardiovascular:      Rate and Rhythm: Normal rate and regular rhythm.      Pulses: Normal pulses.      Heart sounds: Normal heart sounds. No murmur heard.    No friction rub. No gallop.   Pulmonary:      Effort: Pulmonary effort is normal. No respiratory distress.      Breath sounds: Normal breath sounds.   Abdominal:      General: Bowel sounds are normal. There is no distension.      Palpations: Abdomen is soft. There is no mass.      Tenderness: There is no abdominal tenderness. There is no guarding or rebound.   Musculoskeletal:         General: No tenderness. Normal range of motion.      Cervical back: Normal range of motion and neck supple. No erythema or rigidity.      Right lower leg: No edema.      Left lower leg: No edema.   Lymphadenopathy:      Cervical: No cervical adenopathy.   Skin:     General: Skin is warm and dry.      Capillary Refill: Capillary refill takes less than 2 seconds.      Coloration: Skin is not pale.      Findings: No erythema or rash.   Neurological:      Mental Status: He is alert and oriented to person, place, and time.      Cranial Nerves: No cranial nerve deficit.      Sensory: No sensory deficit.      Motor: Motor function is intact.   Psychiatric:         Mood and Affect: Mood and affect normal.         Speech: Speech normal.         Behavior: Behavior normal.         ED Course      Procedures                     Results for orders placed or performed during the hospital encounter of 11/08/22   Symptomatic; Unknown Influenza A/B &  SARS-CoV2 (COVID-19) Virus PCR Multiplex Nasopharyngeal     Status: Abnormal    Specimen: Nasopharyngeal; Swab   Result Value Ref Range    Influenza A PCR Positive (A) Negative    Influenza B PCR Negative Negative    RSV PCR Negative Negative    SARS CoV2 PCR Negative Negative    Narrative    Testing was performed using the Xpert Xpress CoV2/Flu/RSV Assay on the [employer] GeneXpert Instrument. This test should be ordered for the detection of SARS-CoV-2 and influenza viruses in individuals who meet clinical and/or epidemiological criteria. Test performance is unknown in asymptomatic patients. This test is for in vitro diagnostic use under the FDA EUA for laboratories certified under CLIA to perform high or moderate complexity testing. This test has not been FDA cleared or approved. A negative result does not rule out the presence of PCR inhibitors in the specimen or target RNA in concentration below the limit of detection for the assay. If only one viral target is positive but coinfection with multiple targets is suspected, the sample should be re-tested with another FDA cleared, approved, or authorized test, if coinfection would change clinical management. This test was validated by the [facility] [place] [facility]. These laboratories are certified under the Clinical Laboratory Improvement Amendments of 1988 (CLIA-88) as qualified to perform high complexity laboratory testing.     Medications - No data to display     Assessments & Plan (with Medical Decision Making)     This patient presented to the emergency department with symptoms consistent with an influenza-like illness.  Testing demonstrates that he is influenza A positive.  He will be discharged with prescription for Tamiflu and instructions for care and follow-up.  He was not hypoxic and did not appear toxic on a clinical basis.    I have reviewed the nursing notes. I have reviewed the findings, diagnosis, plan and need for follow up with the patient.    New  Prescriptions    OSELTAMIVIR (TAMIFLU) 75 MG CAPSULE    Take 1 capsule (75 mg) by mouth 2 times daily for 5 days       Final diagnoses:   Influenza A       --  Keyur BRANCH [facility] EMERGENCY DEPARTMENT  11/8/2022     Keyur Cobb MD  11/08/22 0902

## 2022-12-27 ENCOUNTER — APPOINTMENT (OUTPATIENT)
Dept: GENERAL RADIOLOGY | Facility: CLINIC | Age: 47
End: 2022-12-27
Attending: EMERGENCY MEDICINE
Payer: COMMERCIAL

## 2022-12-27 ENCOUNTER — HOSPITAL ENCOUNTER (EMERGENCY)
Facility: CLINIC | Age: 47
Discharge: HOME | End: 2022-12-28
Attending: EMERGENCY MEDICINE | Admitting: EMERGENCY MEDICINE
Payer: COMMERCIAL

## 2022-12-27 DIAGNOSIS — R07.9 CHEST PAIN IN ADULT: ICD-10-CM

## 2022-12-27 LAB
ANION GAP SERPL CALCULATED.3IONS-SCNC: 6 MMOL/L (ref 3–14)
ATRIAL RATE - MUSE: 72 BPM
BASOPHILS # BLD AUTO: 0.1 10E3/UL (ref 0–0.2)
BASOPHILS NFR BLD AUTO: 1 %
BUN SERPL-MCNC: 30 MG/DL (ref 7–30)
CALCIUM SERPL-MCNC: 9.4 MG/DL (ref 8.5–10.1)
CHLORIDE BLD-SCNC: 107 MMOL/L (ref 94–109)
CO2 SERPL-SCNC: 28 MMOL/L (ref 20–32)
CREAT SERPL-MCNC: 0.83 MG/DL (ref 0.66–1.25)
DIASTOLIC BLOOD PRESSURE - MUSE: NORMAL MMHG
EOSINOPHIL # BLD AUTO: 0.3 10E3/UL (ref 0–0.7)
EOSINOPHIL NFR BLD AUTO: 3 %
ERYTHROCYTE [DISTWIDTH] IN BLOOD BY AUTOMATED COUNT: 13.1 % (ref 10–15)
GFR SERPL CREATININE-BSD FRML MDRD: >90 ML/MIN/1.73M2
GLUCOSE BLD-MCNC: 142 MG/DL (ref 70–99)
HCT VFR BLD AUTO: 43.4 % (ref 40–53)
HGB BLD-MCNC: 14.3 G/DL (ref 13.3–17.7)
IMM GRANULOCYTES # BLD: 0 10E3/UL
IMM GRANULOCYTES NFR BLD: 0 %
INTERPRETATION ECG - MUSE: NORMAL
LYMPHOCYTES # BLD AUTO: 2 10E3/UL (ref 0.8–5.3)
LYMPHOCYTES NFR BLD AUTO: 24 %
MCH RBC QN AUTO: 29.1 PG (ref 26.5–33)
MCHC RBC AUTO-ENTMCNC: 32.9 G/DL (ref 31.5–36.5)
MCV RBC AUTO: 88 FL (ref 78–100)
MONOCYTES # BLD AUTO: 0.5 10E3/UL (ref 0–1.3)
MONOCYTES NFR BLD AUTO: 6 %
NEUTROPHILS # BLD AUTO: 5.4 10E3/UL (ref 1.6–8.3)
NEUTROPHILS NFR BLD AUTO: 66 %
NRBC # BLD AUTO: 0 10E3/UL
NRBC BLD AUTO-RTO: 0 /100
P AXIS - MUSE: -1 DEGREES
PLATELET # BLD AUTO: 353 10E3/UL (ref 150–450)
POTASSIUM BLD-SCNC: 3.8 MMOL/L (ref 3.4–5.3)
PR INTERVAL - MUSE: 152 MS
QRS DURATION - MUSE: 96 MS
QT - MUSE: 376 MS
QTC - MUSE: 411 MS
R AXIS - MUSE: 79 DEGREES
RBC # BLD AUTO: 4.92 10E6/UL (ref 4.4–5.9)
SODIUM SERPL-SCNC: 141 MMOL/L (ref 133–144)
SYSTOLIC BLOOD PRESSURE - MUSE: NORMAL MMHG
T AXIS - MUSE: 47 DEGREES
TROPONIN I SERPL HS-MCNC: 5 NG/L
VENTRICULAR RATE- MUSE: 72 BPM
WBC # BLD AUTO: 8.2 10E3/UL (ref 4–11)

## 2022-12-27 PROCEDURE — 71046 X-RAY EXAM CHEST 2 VIEWS: CPT

## 2022-12-27 PROCEDURE — 80048 BASIC METABOLIC PNL TOTAL CA: CPT | Performed by: EMERGENCY MEDICINE

## 2022-12-27 PROCEDURE — 93308 TTE F-UP OR LMTD: CPT | Mod: 26 | Performed by: EMERGENCY MEDICINE

## 2022-12-27 PROCEDURE — 93005 ELECTROCARDIOGRAM TRACING: CPT | Performed by: EMERGENCY MEDICINE

## 2022-12-27 PROCEDURE — 99285 EMERGENCY DEPT VISIT HI MDM: CPT | Mod: 25 | Performed by: EMERGENCY MEDICINE

## 2022-12-27 PROCEDURE — 250N000013 HC RX MED GY IP 250 OP 250 PS 637: Performed by: EMERGENCY MEDICINE

## 2022-12-27 PROCEDURE — 85025 COMPLETE CBC W/AUTO DIFF WBC: CPT | Performed by: EMERGENCY MEDICINE

## 2022-12-27 PROCEDURE — 93308 TTE F-UP OR LMTD: CPT | Performed by: EMERGENCY MEDICINE

## 2022-12-27 PROCEDURE — 84484 ASSAY OF TROPONIN QUANT: CPT | Performed by: EMERGENCY MEDICINE

## 2022-12-27 PROCEDURE — 93010 ELECTROCARDIOGRAM REPORT: CPT | Mod: 59 | Performed by: EMERGENCY MEDICINE

## 2022-12-27 PROCEDURE — 36415 COLL VENOUS BLD VENIPUNCTURE: CPT | Performed by: EMERGENCY MEDICINE

## 2022-12-27 RX ORDER — ASPIRIN 81 MG/1
324 TABLET, CHEWABLE ORAL ONCE
Status: COMPLETED | OUTPATIENT
Start: 2022-12-27 | End: 2022-12-27

## 2022-12-27 RX ADMIN — ASPIRIN 81 MG CHEWABLE TABLET 324 MG: 81 TABLET CHEWABLE at 22:52

## 2022-12-27 ASSESSMENT — ACTIVITIES OF DAILY LIVING (ADL): ADLS_ACUITY_SCORE: 35

## 2022-12-28 VITALS
RESPIRATION RATE: 27 BRPM | SYSTOLIC BLOOD PRESSURE: 109 MMHG | TEMPERATURE: 98.5 F | DIASTOLIC BLOOD PRESSURE: 69 MMHG | OXYGEN SATURATION: 96 % | WEIGHT: 221.1 LBS | HEIGHT: 71 IN | BODY MASS INDEX: 30.95 KG/M2 | HEART RATE: 76 BPM

## 2022-12-28 LAB — TROPONIN I SERPL HS-MCNC: 4 NG/L

## 2022-12-28 PROCEDURE — 36415 COLL VENOUS BLD VENIPUNCTURE: CPT | Performed by: EMERGENCY MEDICINE

## 2022-12-28 PROCEDURE — 84484 ASSAY OF TROPONIN QUANT: CPT | Mod: 91 | Performed by: EMERGENCY MEDICINE

## 2022-12-28 PROCEDURE — 84484 ASSAY OF TROPONIN QUANT: CPT | Performed by: EMERGENCY MEDICINE

## 2022-12-28 ASSESSMENT — HEART SCORE
HEART SCORE: 3
HISTORY: MODERATELY SUSPICIOUS
RISK FACTORS: 1-2 RISK FACTORS
AGE: 45-64
ECG: NORMAL
TROPONIN: LESS THAN OR EQUAL TO NORMAL LIMIT

## 2022-12-28 ASSESSMENT — ACTIVITIES OF DAILY LIVING (ADL): ADLS_ACUITY_SCORE: 35

## 2022-12-28 NOTE — DISCHARGE INSTRUCTIONS
Instructions from your doctor today:  Emergency Department testing is focused on the potential causes of your symptoms that are the most dangerous possibilities, and cannot cover every possibility. Based on the evaluation, it was deemed sufficiently safe to discharge and continue management through the clinics. Thus, follow-up is very important to assess for improvement/worsening, potential further testing, and potential treatment adjustments. If you were given opioid pain medications or other medications that can make you drowsy while in the ED, you should not drive for at least several hours and not until you feel completely back to normal.     Please make an appointment to follow up with:  - Your Primary Care Provider in 2-4 days for recheck and discussion of a stress test  - If you do not have a primary care provider, you can be seen in follow-up and establish care by calling any of the clinics below:     - Primary Care Center (phone: [phone])     - Primary Care / [facility] (phone: [phone])   - Have your clinic provider review the results from today's visit with you again, including any potential follow-up or additional testing that may be needed based on the results. Occasionally, incidental findings are found on later review by radiologists that may need follow-up.     Return to the Emergency Department immediately if you have difficulty breathing, worsening symptoms, or any other urgent or potentially life-threatening concerns.

## 2022-12-28 NOTE — ED TRIAGE NOTES
Patient reported left anterior chest pain that radiates to left shoulder, left arm, and left upper back. He said pain stared around 1800. Patient also c/o headache. Patient denies dizziness and light headedness. Patient denies shortness of breath but reported he needs to take  Deep breath every now and then.      Triage Assessment     Row Name 12/27/22 8744       Triage Assessment (Adult)    Airway WDL WDL       Respiratory WDL    Respiratory WDL WDL       Skin Circulation/Temperature WDL    Skin Circulation/Temperature WDL WDL       Cardiac WDL    Cardiac WDL X;chest pain       Chest Pain Assessment    Chest Pain Location anterior chest, left    Chest Pain Radiation arm;shoulder    Character squeezing    Chest Pain Intervention 12-lead ECG obtained       Peripheral/Neurovascular WDL    Peripheral Neurovascular WDL WDL       Cognitive/Neuro/Behavioral WDL    Cognitive/Neuro/Behavioral WDL WDL

## 2022-12-28 NOTE — ED PROVIDER NOTES
"  History     Chief Complaint   Patient presents with     Chest Pain     HPI  Javier Vizcaino is a 47 year old male with PMH notable for GERD and dyslipidemia who presents to the ED with chest pain.  Symptom onset around 5:30 PM while the patient was sitting watching TV.  Patient notes pain near his left shoulder and anterior chest.  It radiates somewhat down the left upper arm.  No clear aggravating or alleviating factors.  He has not had pain like this before.  No shortness of breath.  Minimal cough last night but not otherwise.  No recent fevers no vomiting.  Pain has improved since onset but is still noticeable.    Past Medical History  History reviewed. No pertinent past medical history.  Past Surgical History:   Procedure Laterality Date     ABDOMEN SURGERY      appendectomy     CARDIAC SURGERY       acetaminophen (TYLENOL) 500 MG tablet  dextromethorphan-guaiFENesin (MUCINEX DM)  MG 12 hr tablet  ibuprofen (ADVIL/MOTRIN) 200 MG tablet      No Known Allergies  Social History   Social History     Tobacco Use     Smoking status: Never     Smokeless tobacco: Never   Substance Use Topics     Alcohol use: No     Drug use: No      Past medical history and social history were reviewed with the patient. Additional pertinent items: None     Review of Systems  A complete review of systems was performed with pertinent positives and negatives noted in the HPI, and all other systems negative.    Physical Exam   BP: 110/75  Pulse: 76  Temp: 98.5  F (36.9  C)  Resp: 16  Height: 180.3 cm (5' 11\")  Weight: 100.3 kg (221 lb 1.6 oz)  SpO2: 97 %    Physical Exam  General: no acute distress. Appears stated age.   HENT: MMM, no oropharyngeal lesions  Eyes: PERRL, normal sclerae  Cardio: Regular rate. Regular rhythm. Extremities well perfused  Resp: Normal work of breathing, normal respiratory rate.  Chest/Back: no visual signs of trauma, no chest wall tenderness, pain is partially reproduced with left shoulder range of " motion  Abdomen: no tenderness, non-distended, no rebound, no guarding  Neuro: alert and fully oriented. CN II-XII grossly intact. Grossly normal strength and sensation in all extremities.   MSK: no deformities. Grossly normal ROM in extremities.   Integumentary/Skin: no rash visualized, normal color  Psych: normal affect, normal behavior    ED Course      Procedures  Results for orders placed during the hospital encounter of 12/27/22    POC US ECHO LIMITED    Impression  Limited Bedside ED Cardiac Ultrasound  PROCEDURE: PERFORMED BY: Dr. Elie Ace MD  INDICATIONS/SYMPTOM:  Chest Pain  PROBE: Cardiac phased array probe  BODY LOCATION: Chest (cardiac)  FINDINGS: The ultrasound was performed utilizing the subcostal, parasternal long axis, parasternal short axis and apical 4 chamber views.  Grossly normal LV contractility. No RV dilation visualized. No pericardial effusion visualized.  INTERPRETATION:    Grossly normal LV contractility. No pericardial effusion. No RV dilation.  IMAGE DOCUMENTATION: Images were archived to PACs system.            EKG Interpretation:      Interpreted by Elie Ace MD  Time reviewed: 2123  Symptoms at time of EKG: chest pain   Rhythm: normal sinus   Rate: normal  Axis: normal  Ectopy: none  Conduction: normal  ST Segments/ T Waves: No ST-T wave changes  Q Waves: none  Comparison to prior: No old EKG available    Clinical Impression: normal EKG             Labs Ordered and Resulted from Time of ED Arrival to Time of ED Departure   BASIC METABOLIC PANEL - Abnormal       Result Value    Sodium 141      Potassium 3.8      Chloride 107      Carbon Dioxide (CO2) 28      Anion Gap 6      Urea Nitrogen 30      Creatinine 0.83      Calcium 9.4      Glucose 142 (*)     GFR Estimate >90     TROPONIN I - Normal    Troponin I High Sensitivity 5     CBC WITH PLATELETS AND DIFFERENTIAL    WBC Count 8.2      RBC Count 4.92      Hemoglobin 14.3      Hematocrit 43.4      MCV 88      MCH  29.1      MCHC 32.9      RDW 13.1      Platelet Count 353      % Neutrophils 66      % Lymphocytes 24      % Monocytes 6      % Eosinophils 3      % Basophils 1      % Immature Granulocytes 0      NRBCs per 100 WBC 0      Absolute Neutrophils 5.4      Absolute Lymphocytes 2.0      Absolute Monocytes 0.5      Absolute Eosinophils 0.3      Absolute Basophils 0.1      Absolute Immature Granulocytes 0.0      Absolute NRBCs 0.0     TROPONIN I     XR Chest 2 Views   Final Result   IMPRESSION: No acute abnormality.      POC US ECHO LIMITED   Final Result   Limited Bedside ED Cardiac Ultrasound   PROCEDURE: PERFORMED BY: Dr. Elie Ace MD   INDICATIONS/SYMPTOM:  Chest Pain   PROBE: Cardiac phased array probe   BODY LOCATION: Chest (cardiac)   FINDINGS: The ultrasound was performed utilizing the subcostal, parasternal long axis, parasternal short axis and apical 4 chamber views.   Grossly normal LV contractility. No RV dilation visualized. No pericardial effusion visualized.    INTERPRETATION:    Grossly normal LV contractility. No pericardial effusion. No RV dilation.    IMAGE DOCUMENTATION: Images were archived to PACs system.                Assessments & Plan (with Medical Decision Making)   Patient presenting with chest pain. Vitals in the ED unremarkable. Nursing notes reviewed. Initial differential diagnosis includes but not limited to ACS, musculoskeletal pain, PE, pericarditis. Aspirin given.     ECG shows NSR without evidence of acute ischemia, HEART score 3 (low risk range), high-sensitivity troponin negative x2,  - ACS very unlikely. VTE risk factor profile very low, PERC met - PE very unlikely. Character and severity of pain less severe than would be expected for aortic dissection. Bedside cardiac US demonstrates grossly normal LV contractility, no RV dilation, no pericardial effusion. Lack of ECG findings and lack of effusion makes pericarditis very unlikely. CXR without evidence of pneumonia,  pneumothorax, pleural effusion, nor other visualized pathology.      The complete clinical picture is most consistent with chest pain of unclear etiology with reassuring broad work-up for potentially dangerous pathology. After counseling on the diagnosis, work-up, and treatment plan, the patient was discharged to home. The patient was advised to follow-up with primary care in a few days for recheck and discussion of a stress test. The patient was advised to return to the ED if worsening symptoms, or if there are any urgent/life-threatening concerns.     Final diagnoses:   Chest pain in adult     New Prescriptions    No medications on file         Medical Decision Making  The patient presented with a problem that is an acute illness with systemic symptoms.    The patient's evaluation involved ordering and review of 3+ test(s) and independent interpretation of testing performed by another health professional.    The patient's management involved only simple and very low risk treatment.    --  Elie Ace MD   Emergency Medicine   [facility] EMERGENCY DEPARTMENT  12/27/2022     Elie Ace MD  12/28/22 0235

## 2023-04-22 ENCOUNTER — HEALTH MAINTENANCE LETTER (OUTPATIENT)
Age: 48
End: 2023-04-22

## 2023-07-15 ENCOUNTER — HEALTH MAINTENANCE LETTER (OUTPATIENT)
Age: 48
End: 2023-07-15

## 2024-09-07 ENCOUNTER — HEALTH MAINTENANCE LETTER (OUTPATIENT)
Age: 49
End: 2024-09-07